# Patient Record
Sex: FEMALE | URBAN - METROPOLITAN AREA
[De-identification: names, ages, dates, MRNs, and addresses within clinical notes are randomized per-mention and may not be internally consistent; named-entity substitution may affect disease eponyms.]

---

## 2023-04-18 ENCOUNTER — NURSE TRIAGE (OUTPATIENT)
Dept: ADMINISTRATIVE | Facility: CLINIC | Age: 33
End: 2023-04-18

## 2023-04-19 NOTE — TELEPHONE ENCOUNTER
Pt calling from [place], advised to go to ed if having an emergency as not part of our nurse compact licence.    Reason for Disposition   Nursing judgment    Protocols used: No Guideline or Reference [id number]